# Patient Record
Sex: FEMALE | Race: OTHER | Employment: UNEMPLOYED | URBAN - METROPOLITAN AREA
[De-identification: names, ages, dates, MRNs, and addresses within clinical notes are randomized per-mention and may not be internally consistent; named-entity substitution may affect disease eponyms.]

---

## 2017-10-03 ENCOUNTER — OFFICE VISIT (OUTPATIENT)
Dept: OBGYN CLINIC | Facility: CLINIC | Age: 52
End: 2017-10-03

## 2017-10-03 ENCOUNTER — LAB ENCOUNTER (OUTPATIENT)
Dept: LAB | Facility: HOSPITAL | Age: 52
End: 2017-10-03
Attending: ADVANCED PRACTICE MIDWIFE

## 2017-10-03 VITALS
DIASTOLIC BLOOD PRESSURE: 76 MMHG | BODY MASS INDEX: 36 KG/M2 | WEIGHT: 176 LBS | HEART RATE: 94 BPM | SYSTOLIC BLOOD PRESSURE: 131 MMHG

## 2017-10-03 DIAGNOSIS — D25.9 UTERINE LEIOMYOMA, UNSPECIFIED LOCATION: ICD-10-CM

## 2017-10-03 DIAGNOSIS — N93.8 DUB (DYSFUNCTIONAL UTERINE BLEEDING): ICD-10-CM

## 2017-10-03 DIAGNOSIS — N93.8 DUB (DYSFUNCTIONAL UTERINE BLEEDING): Primary | ICD-10-CM

## 2017-10-03 DIAGNOSIS — E11.00 TYPE 2 DIABETES MELLITUS WITH HYPEROSMOLARITY WITHOUT COMA, WITHOUT LONG-TERM CURRENT USE OF INSULIN (HCC): ICD-10-CM

## 2017-10-03 DIAGNOSIS — R81 GLUCOSURIA: ICD-10-CM

## 2017-10-03 DIAGNOSIS — N85.2 ENLARGED UTERUS: ICD-10-CM

## 2017-10-03 PROCEDURE — 85060 BLOOD SMEAR INTERPRETATION: CPT

## 2017-10-03 PROCEDURE — 85025 COMPLETE CBC W/AUTO DIFF WBC: CPT

## 2017-10-03 PROCEDURE — 36415 COLL VENOUS BLD VENIPUNCTURE: CPT

## 2017-10-03 PROCEDURE — 99203 OFFICE O/P NEW LOW 30 MIN: CPT | Performed by: ADVANCED PRACTICE MIDWIFE

## 2017-10-03 RX ORDER — LISINOPRIL AND HYDROCHLOROTHIAZIDE 25; 20 MG/1; MG/1
TABLET ORAL
Status: ON HOLD | COMMUNITY
Start: 2014-05-07 | End: 2018-07-16

## 2017-10-03 RX ORDER — MEDROXYPROGESTERONE ACETATE 10 MG/1
10 TABLET ORAL DAILY
Qty: 30 TABLET | Refills: 3 | Status: ON HOLD | OUTPATIENT
Start: 2017-10-03 | End: 2018-07-16 | Stop reason: ALTCHOICE

## 2017-10-03 NOTE — PROGRESS NOTES
HPI:    Patient ID: Mirella Palma is a 46year old female. Has been having pelvic pain right to left side since this morning. Pain started 5 am today. 9/10. Took some tylenol for the pain and a little better. Denies discharge.   Is currently on menses status: Never Smoker                                                              Smokeless tobacco: Never Used                      Alcohol use:  No                 Urine dip today shows large blood and glucose of 2000         Current Outpatient Prescripti unspecified location    1. C/W Dr. Elizabeth Verma. Pt to be given Provera 30 mg QD for 3 months. 2.  Referral sent to [facility] for care   3. Ibuprofen for pain  4. Advised to see her PCP for elevated blood sugar ASAP.   Advised of danger of pers

## 2017-10-05 ENCOUNTER — TELEPHONE (OUTPATIENT)
Dept: OBGYN CLINIC | Facility: CLINIC | Age: 52
End: 2017-10-05

## 2017-10-05 NOTE — TELEPHONE ENCOUNTER
Per pt recently was seen by BULMARO who advised she needed a pap, pelvic u/s, and mammogram.  Per pt she was told her uterus was enlarged and was referred to [facility] to due exams and for f/u since is currently uninsured.    Has apt in [facility] on 10/27,

## 2017-10-05 NOTE — TELEPHONE ENCOUNTER
Per pt she would like to be seen in [place], has the appt schedule at [facility] till 10/27/2017, per pt she still has blood clots, pelvic  pain

## 2017-10-05 NOTE — TELEPHONE ENCOUNTER
Spoke to financial counselor of [facility] and was told of need to leave 25% deposit at the time of service. Pt should call price estimate dept with CPT codes for procedure. Number to call [phone].    Routing to calls pt to be informed

## 2017-10-06 NOTE — TELEPHONE ENCOUNTER
Pt informed of needing to leave 25% deposit prior to each service. Pt reported she will discuss with  and contact our office if needed.

## 2017-10-20 ENCOUNTER — TELEPHONE (OUTPATIENT)
Dept: OBGYN CLINIC | Facility: CLINIC | Age: 52
End: 2017-10-20

## 2017-10-20 NOTE — TELEPHONE ENCOUNTER
Patient had menses twice this month with occasional mild cramping  Cramping, no pain would like to find out if this is normal and what would be the next step of care

## 2017-10-20 NOTE — TELEPHONE ENCOUNTER
Pt saw Piero Lainez on 10/3 for bleeding. Stopped bleeding and is now bleeding again. Would like to know if that is normal? Got her menses twice this month.

## 2017-10-23 ENCOUNTER — OFFICE VISIT (OUTPATIENT)
Dept: OBGYN CLINIC | Facility: CLINIC | Age: 52
End: 2017-10-23

## 2017-10-23 VITALS
SYSTOLIC BLOOD PRESSURE: 124 MMHG | DIASTOLIC BLOOD PRESSURE: 70 MMHG | WEIGHT: 180 LBS | BODY MASS INDEX: 35.34 KG/M2 | HEIGHT: 60 IN

## 2017-10-23 DIAGNOSIS — R10.32 ABDOMINAL PAIN, LEFT LOWER QUADRANT: Primary | ICD-10-CM

## 2017-10-23 PROCEDURE — 99213 OFFICE O/P EST LOW 20 MIN: CPT | Performed by: ADVANCED PRACTICE MIDWIFE

## 2017-10-23 RX ORDER — IBUPROFEN 800 MG/1
800 TABLET ORAL EVERY 6 HOURS PRN
Qty: 30 TABLET | Refills: 1 | Status: ON HOLD | OUTPATIENT
Start: 2017-10-23 | End: 2018-07-16 | Stop reason: ALTCHOICE

## 2017-10-23 NOTE — TELEPHONE ENCOUNTER
Bulgarian phone line  #[phone] used to translate phone call. Appointment made for pt to see sjm at 3:00 pm today 10/23/17.

## 2017-10-23 NOTE — PROGRESS NOTES
S.  Was seen on 10/3/17 for bleeding from 9/28/17 and pain. Exam revealed 20 week size uterus. EMBX was attempted and failed due to inabilty to enter uterine cavity through cervix. Pt was referred to [facility] for care after consult with Dr. Hollie Rodriguez.   Here t

## 2018-01-03 ENCOUNTER — TELEPHONE (OUTPATIENT)
Dept: OBGYN CLINIC | Facility: CLINIC | Age: 53
End: 2018-01-03

## 2018-01-03 NOTE — TELEPHONE ENCOUNTER
Per pt she went to [facility] had emergency, and got really sick and had surgery up there, per pt she had a hysterectomy and was told she needs to see the oncologist, pt would like to be referred to one and would like to know what's the next step ?  Was told sh

## 2018-01-04 NOTE — TELEPHONE ENCOUNTER
I spoke with dr Home Valdovinos he said have the patient bring her records and see Him.  She and her  only speak Romanian and she needs to see an MD. He will then review the records and make a referral

## 2018-01-04 NOTE — TELEPHONE ENCOUNTER
Per pt the 32 th of Oct went to [facility] ([facility]), but rescheduled to the 17th of November because they didn't do anything for her. She didn't get to go to that apt because she went to [facility] for an emergency.    Per pt during her stay began hemorrhaging and had

## 2018-01-09 ENCOUNTER — OFFICE VISIT (OUTPATIENT)
Dept: OBGYN CLINIC | Facility: CLINIC | Age: 53
End: 2018-01-09

## 2018-01-09 ENCOUNTER — TELEPHONE (OUTPATIENT)
Dept: OBGYN CLINIC | Facility: CLINIC | Age: 53
End: 2018-01-09

## 2018-01-09 VITALS
WEIGHT: 171.13 LBS | HEIGHT: 59 IN | BODY MASS INDEX: 34.5 KG/M2 | SYSTOLIC BLOOD PRESSURE: 122 MMHG | DIASTOLIC BLOOD PRESSURE: 70 MMHG

## 2018-01-09 DIAGNOSIS — C54.9 UTERINE CANCER, SARCOMA (HCC): Primary | ICD-10-CM

## 2018-01-09 PROCEDURE — 99213 OFFICE O/P EST LOW 20 MIN: CPT | Performed by: OBSTETRICS & GYNECOLOGY

## 2018-01-09 NOTE — PROGRESS NOTES
HPI:    Patient ID: Jennifer Ta is a 46year old female. HPI  Patient here with records from hospitalization in [facility].  S/P SUKHJINDER-BSO for symptomatic Fibroid uterus on 11/8/2017. Pathology shows Uterine leiomyosarcoma Stage 1B. Patient is uninsured. Uterine cancer, sarcoma (hcc)  (primary encounter diagnosis)  Records reviewed and copies made. All questions answered. No orders of the defined types were placed in this encounter.       Meds This Visit:  No prescriptions requested or ordered in this

## 2018-01-09 NOTE — TELEPHONE ENCOUNTER
Per Maryan they don't cover uterine cancer  But provided numbers that may give uterine cancer resources  [phone]

## 2018-01-09 NOTE — TELEPHONE ENCOUNTER
Left vm regarding patient's case to the [facility] breast and cervical cancer program. Pt Dx with Uterine leiomyosarcoma Stage 1B. Patient is uninsured. Need to ask if patient may also qualified for assistance with this type of Dx.      Called [facility]

## 2018-01-12 NOTE — TELEPHONE ENCOUNTER
1)Called [facility] at [phone] and was referred to  Manuel Betancourt [phone] which is a coordinator for new oncology patients.  Left   Oncology fax [phone]    2) Per rep from Uterine Cancer PAS program [phone]  Free  will co

## 2018-01-16 NOTE — TELEPHONE ENCOUNTER
[phone] (home)  [place] [address] and was transferred to ER physician who gave the pager number to a gyne oncologist Dr. Dr. Logan Mckeon  Pager number [phone]  Per Dr. Amanda Mckeon she will be giving us or patient a call for an apt.

## 2018-01-16 NOTE — TELEPHONE ENCOUNTER
1) Called Masha Huynh and left another message. 2) Will need to call PAS program on 1/17 if no call from       3) Per pt she never got a denial letter because she was not a candidate for applying.

## 2018-01-17 NOTE — TELEPHONE ENCOUNTER
1)Faxed records over to number provided attn: Bayron Otero and left message to inquire if they were received. Found that Dr. Delia Todd was a resident at [facility] and specialty was obgyne, not gyne oncology.     2) Marck Georges, a [employer] non [employer] profit case ma

## 2018-01-17 NOTE — TELEPHONE ENCOUNTER
Pt informed of status. Verbalized understanding. Stated she was going to try and reapply for public aid tomorrow.

## 2018-01-17 NOTE — TELEPHONE ENCOUNTER
Artur Lott called, requesting records to be faxed to to [phone] with a cover sheet attn Radha Holloway

## 2018-01-18 NOTE — TELEPHONE ENCOUNTER
Per pt she would like to inform that she has a appt schedule for 02/14/2018 with the oncologist at [facility]

## 2018-01-23 ENCOUNTER — TELEPHONE (OUTPATIENT)
Dept: OBGYN CLINIC | Facility: CLINIC | Age: 53
End: 2018-01-23

## 2018-01-24 NOTE — TELEPHONE ENCOUNTER
[facility] contacted pt, has filled out paperwork with them  Has appt with oncology, a u/s and something else in feb and march at [email or web address]

## 2018-02-06 ENCOUNTER — OFFICE VISIT (OUTPATIENT)
Dept: OBGYN CLINIC | Facility: CLINIC | Age: 53
End: 2018-02-06

## 2018-02-06 ENCOUNTER — TELEPHONE (OUTPATIENT)
Dept: OBGYN CLINIC | Facility: CLINIC | Age: 53
End: 2018-02-06

## 2018-02-06 VITALS
SYSTOLIC BLOOD PRESSURE: 160 MMHG | BODY MASS INDEX: 34 KG/M2 | WEIGHT: 170.38 LBS | HEART RATE: 101 BPM | DIASTOLIC BLOOD PRESSURE: 82 MMHG

## 2018-02-06 DIAGNOSIS — R10.2 PELVIC PAIN IN FEMALE: Primary | ICD-10-CM

## 2018-02-06 PROCEDURE — 99213 OFFICE O/P EST LOW 20 MIN: CPT | Performed by: OBSTETRICS & GYNECOLOGY

## 2018-02-06 NOTE — TELEPHONE ENCOUNTER
adriane  Per pt financial councelor at [facility] assisted pt to get approved for public aid, will be choosing [employer]. Was told by [facility] that once her plan became effective they can begin treating her, but she is still going to [facility] for the apt she has on the 14th. Has made an apt with [facility] today for pain she recently started experiencing on right lower abdomen.

## 2018-02-06 NOTE — TELEPHONE ENCOUNTER
Pt states she's been experiencing some pain on her right side close to were she got her surgery, bottom right of umbilical area.  appt made for today at 2:30 [place] speaking

## 2018-02-06 NOTE — PROGRESS NOTES
HPI:    Patient ID: Trae Graves is a 46year old female. HPI  Patient here today because she feels pain in right pelvic area that radiates to right CVA. Denies fever or chills. Patient states pain is tolerable.   H/O Uterine Sarcoma that she had SUKHJINDER-B ASSESSMENT/PLAN:   Pelvic pain in female  (primary encounter diagnosis)  Precautions reviewed. Keep appointmnet with Gyne Onc. Go to ER if pain worsens. Continue with BP medication. No orders of the defined types were placed in this encounter.       Netta Nguyen

## 2018-07-16 ENCOUNTER — APPOINTMENT (OUTPATIENT)
Dept: CT IMAGING | Facility: HOSPITAL | Age: 53
DRG: 948 | End: 2018-07-16
Attending: EMERGENCY MEDICINE
Payer: MEDICAID

## 2018-07-16 ENCOUNTER — HOSPITAL ENCOUNTER (INPATIENT)
Facility: HOSPITAL | Age: 53
LOS: 1 days | Discharge: ANOTHER HOSPITAL | DRG: 948 | End: 2018-07-16
Attending: EMERGENCY MEDICINE | Admitting: HOSPITALIST
Payer: MEDICAID

## 2018-07-16 VITALS
HEIGHT: 58 IN | OXYGEN SATURATION: 96 % | BODY MASS INDEX: 34.82 KG/M2 | TEMPERATURE: 100 F | DIASTOLIC BLOOD PRESSURE: 63 MMHG | SYSTOLIC BLOOD PRESSURE: 103 MMHG | HEART RATE: 122 BPM | WEIGHT: 165.88 LBS | RESPIRATION RATE: 18 BRPM

## 2018-07-16 DIAGNOSIS — R00.0 TACHYCARDIA: ICD-10-CM

## 2018-07-16 DIAGNOSIS — R10.9 INTRACTABLE ABDOMINAL PAIN: Primary | ICD-10-CM

## 2018-07-16 DIAGNOSIS — C79.9 METASTATIC CANCER (HCC): ICD-10-CM

## 2018-07-16 DIAGNOSIS — C55 UTERINE LEIOMYOSARCOMA (HCC): ICD-10-CM

## 2018-07-16 PROCEDURE — 99236 HOSP IP/OBS SAME DATE HI 85: CPT | Performed by: HOSPITALIST

## 2018-07-16 PROCEDURE — 71260 CT THORAX DX C+: CPT | Performed by: EMERGENCY MEDICINE

## 2018-07-16 PROCEDURE — 99252 IP/OBS CONSLTJ NEW/EST SF 35: CPT | Performed by: OBSTETRICS & GYNECOLOGY

## 2018-07-16 PROCEDURE — 74177 CT ABD & PELVIS W/CONTRAST: CPT | Performed by: EMERGENCY MEDICINE

## 2018-07-16 RX ORDER — SODIUM CHLORIDE 0.9 % (FLUSH) 0.9 %
3 SYRINGE (ML) INJECTION AS NEEDED
Status: DISCONTINUED | OUTPATIENT
Start: 2018-07-16 | End: 2018-07-16

## 2018-07-16 RX ORDER — LISINOPRIL AND HYDROCHLOROTHIAZIDE 25; 20 MG/1; MG/1
1 TABLET ORAL DAILY
Status: DISCONTINUED | OUTPATIENT
Start: 2018-07-16 | End: 2018-07-16

## 2018-07-16 RX ORDER — DILTIAZEM HYDROCHLORIDE 60 MG/1
60 TABLET, FILM COATED ORAL AS NEEDED
Qty: 1 TABLET | Refills: 0 | Status: SHIPPED | OUTPATIENT
Start: 2018-07-16

## 2018-07-16 RX ORDER — SODIUM PHOSPHATE, DIBASIC AND SODIUM PHOSPHATE, MONOBASIC 7; 19 G/133ML; G/133ML
1 ENEMA RECTAL ONCE AS NEEDED
Status: DISCONTINUED | OUTPATIENT
Start: 2018-07-16 | End: 2018-07-16

## 2018-07-16 RX ORDER — HYDROCODONE BITARTRATE AND ACETAMINOPHEN 5; 325 MG/1; MG/1
1 TABLET ORAL EVERY 4 HOURS PRN
Qty: 30 TABLET | Refills: 0 | Status: SHIPPED | OUTPATIENT
Start: 2018-07-16 | End: 2018-07-16

## 2018-07-16 RX ORDER — POLYETHYLENE GLYCOL 3350 17 G/17G
17 POWDER, FOR SOLUTION ORAL DAILY PRN
Status: DISCONTINUED | OUTPATIENT
Start: 2018-07-16 | End: 2018-07-16

## 2018-07-16 RX ORDER — DEXTROSE MONOHYDRATE 25 G/50ML
50 INJECTION, SOLUTION INTRAVENOUS AS NEEDED
Status: DISCONTINUED | OUTPATIENT
Start: 2018-07-16 | End: 2018-07-16

## 2018-07-16 RX ORDER — GABAPENTIN 250 MG/5ML
100 SOLUTION ORAL 3 TIMES DAILY
COMMUNITY

## 2018-07-16 RX ORDER — SODIUM CHLORIDE 9 MG/ML
INJECTION, SOLUTION INTRAVENOUS CONTINUOUS
Status: DISCONTINUED | OUTPATIENT
Start: 2018-07-16 | End: 2018-07-16

## 2018-07-16 RX ORDER — POTASSIUM CHLORIDE 14.9 MG/ML
20 INJECTION INTRAVENOUS ONCE
Status: COMPLETED | OUTPATIENT
Start: 2018-07-16 | End: 2018-07-16

## 2018-07-16 RX ORDER — HYDROCODONE BITARTRATE AND ACETAMINOPHEN 5; 325 MG/1; MG/1
1 TABLET ORAL EVERY 4 HOURS PRN
Status: DISCONTINUED | OUTPATIENT
Start: 2018-07-16 | End: 2018-07-16

## 2018-07-16 RX ORDER — BISACODYL 10 MG
10 SUPPOSITORY, RECTAL RECTAL
Status: DISCONTINUED | OUTPATIENT
Start: 2018-07-16 | End: 2018-07-16

## 2018-07-16 RX ORDER — HYDROMORPHONE HCL IN WATER/PF 1 MG/ML
0.5 SYRINGE (ML) INJECTION
Status: DISCONTINUED | OUTPATIENT
Start: 2018-07-16 | End: 2018-07-16

## 2018-07-16 RX ORDER — HYDROMORPHONE HYDROCHLORIDE 1 MG/ML
0.5 INJECTION, SOLUTION INTRAMUSCULAR; INTRAVENOUS; SUBCUTANEOUS EVERY 30 MIN PRN
Status: ACTIVE | OUTPATIENT
Start: 2018-07-16 | End: 2018-07-16

## 2018-07-16 RX ORDER — PROCHLORPERAZINE MALEATE 5 MG/1
10 TABLET ORAL EVERY 6 HOURS PRN
COMMUNITY

## 2018-07-16 RX ORDER — HYDROMORPHONE HYDROCHLORIDE 1 MG/ML
0.4 INJECTION, SOLUTION INTRAMUSCULAR; INTRAVENOUS; SUBCUTANEOUS EVERY 2 HOUR PRN
Status: DISCONTINUED | OUTPATIENT
Start: 2018-07-16 | End: 2018-07-16

## 2018-07-16 RX ORDER — ZOLPIDEM TARTRATE 5 MG/1
5 TABLET ORAL NIGHTLY PRN
Status: DISCONTINUED | OUTPATIENT
Start: 2018-07-16 | End: 2018-07-16

## 2018-07-16 RX ORDER — ACETAMINOPHEN 325 MG/1
650 TABLET ORAL EVERY 6 HOURS PRN
Status: DISCONTINUED | OUTPATIENT
Start: 2018-07-16 | End: 2018-07-16

## 2018-07-16 RX ORDER — MORPHINE SULFATE 4 MG/ML
8 INJECTION, SOLUTION INTRAMUSCULAR; INTRAVENOUS ONCE
Status: COMPLETED | OUTPATIENT
Start: 2018-07-16 | End: 2018-07-16

## 2018-07-16 RX ORDER — DILTIAZEM HYDROCHLORIDE 60 MG/1
60 TABLET, FILM COATED ORAL AS NEEDED
Status: DISCONTINUED | OUTPATIENT
Start: 2018-07-16 | End: 2018-07-16

## 2018-07-16 RX ORDER — ONDANSETRON 2 MG/ML
4 INJECTION INTRAMUSCULAR; INTRAVENOUS EVERY 4 HOURS PRN
Status: DISCONTINUED | OUTPATIENT
Start: 2018-07-16 | End: 2018-07-16

## 2018-07-16 RX ORDER — HEPARIN SODIUM 5000 [USP'U]/ML
5000 INJECTION, SOLUTION INTRAVENOUS; SUBCUTANEOUS EVERY 12 HOURS SCHEDULED
Status: DISCONTINUED | OUTPATIENT
Start: 2018-07-16 | End: 2018-07-16

## 2018-07-16 RX ORDER — METOCLOPRAMIDE HYDROCHLORIDE 5 MG/ML
10 INJECTION INTRAMUSCULAR; INTRAVENOUS EVERY 8 HOURS PRN
Status: DISCONTINUED | OUTPATIENT
Start: 2018-07-16 | End: 2018-07-16

## 2018-07-16 RX ORDER — ENOXAPARIN SODIUM 100 MG/ML
40 INJECTION SUBCUTANEOUS DAILY
Qty: 1 VIAL | Refills: 0 | Status: SHIPPED | OUTPATIENT
Start: 2018-07-17

## 2018-07-16 RX ORDER — SODIUM CHLORIDE 0.9 % (FLUSH) 0.9 %
10 SYRINGE (ML) INJECTION AS NEEDED
Status: DISCONTINUED | OUTPATIENT
Start: 2018-07-16 | End: 2018-07-16

## 2018-07-16 RX ORDER — ONDANSETRON 2 MG/ML
4 INJECTION INTRAMUSCULAR; INTRAVENOUS EVERY 6 HOURS PRN
Status: DISCONTINUED | OUTPATIENT
Start: 2018-07-16 | End: 2018-07-16

## 2018-07-16 RX ORDER — MORPHINE SULFATE 4 MG/ML
4 INJECTION, SOLUTION INTRAMUSCULAR; INTRAVENOUS ONCE
Status: DISCONTINUED | OUTPATIENT
Start: 2018-07-16 | End: 2018-07-16

## 2018-07-16 RX ORDER — POLYETHYLENE GLYCOL 3350 17 G/17G
17 POWDER, FOR SOLUTION ORAL DAILY PRN
Qty: 1 EACH | Refills: 0 | Status: SHIPPED | OUTPATIENT
Start: 2018-07-16

## 2018-07-16 RX ORDER — SODIUM CHLORIDE 9 MG/ML
150 INJECTION, SOLUTION INTRAVENOUS CONTINUOUS
Qty: 1 CONTAINER | Refills: 0 | Status: SHIPPED | OUTPATIENT
Start: 2018-07-16

## 2018-07-16 RX ORDER — METOPROLOL SUCCINATE 25 MG/1
25 TABLET, EXTENDED RELEASE ORAL DAILY
Status: DISCONTINUED | OUTPATIENT
Start: 2018-07-16 | End: 2018-07-16

## 2018-07-16 RX ORDER — ENOXAPARIN SODIUM 100 MG/ML
40 INJECTION SUBCUTANEOUS DAILY
Status: DISCONTINUED | OUTPATIENT
Start: 2018-07-16 | End: 2018-07-16

## 2018-07-16 RX ORDER — HYDROMORPHONE HYDROCHLORIDE 1 MG/ML
0.8 INJECTION, SOLUTION INTRAMUSCULAR; INTRAVENOUS; SUBCUTANEOUS EVERY 2 HOUR PRN
Status: DISCONTINUED | OUTPATIENT
Start: 2018-07-16 | End: 2018-07-16

## 2018-07-16 RX ORDER — MORPHINE SULFATE 4 MG/ML
8 INJECTION, SOLUTION INTRAMUSCULAR; INTRAVENOUS ONCE
Status: DISCONTINUED | OUTPATIENT
Start: 2018-07-16 | End: 2018-07-16 | Stop reason: SDUPTHER

## 2018-07-16 RX ORDER — METOPROLOL SUCCINATE 25 MG/1
25 TABLET, EXTENDED RELEASE ORAL DAILY
COMMUNITY

## 2018-07-16 RX ORDER — ONDANSETRON 2 MG/ML
4 INJECTION INTRAMUSCULAR; INTRAVENOUS EVERY 6 HOURS PRN
Qty: 1 VIAL | Refills: 0 | Status: SHIPPED | OUTPATIENT
Start: 2018-07-16

## 2018-07-16 RX ORDER — MAGNESIUM SULFATE HEPTAHYDRATE 40 MG/ML
2 INJECTION, SOLUTION INTRAVENOUS ONCE
Status: COMPLETED | OUTPATIENT
Start: 2018-07-16 | End: 2018-07-16

## 2018-07-16 RX ORDER — ZOLPIDEM TARTRATE 5 MG/1
5 TABLET ORAL NIGHTLY PRN
Qty: 1 TABLET | Refills: 0 | Status: SHIPPED | OUTPATIENT
Start: 2018-07-16

## 2018-07-16 RX ORDER — DEXTROSE, SODIUM CHLORIDE, AND POTASSIUM CHLORIDE 5; .9; .15 G/100ML; G/100ML; G/100ML
INJECTION INTRAVENOUS CONTINUOUS
Status: DISCONTINUED | OUTPATIENT
Start: 2018-07-16 | End: 2018-07-16

## 2018-07-16 RX ORDER — HYDROCODONE BITARTRATE AND ACETAMINOPHEN 5; 325 MG/1; MG/1
2 TABLET ORAL EVERY 4 HOURS PRN
Status: DISCONTINUED | OUTPATIENT
Start: 2018-07-16 | End: 2018-07-16

## 2018-07-16 RX ORDER — HYDROMORPHONE HCL IN WATER/PF 1 MG/ML
1 SYRINGE (ML) INJECTION
Status: DISCONTINUED | OUTPATIENT
Start: 2018-07-16 | End: 2018-07-16

## 2018-07-16 RX ORDER — ACETAMINOPHEN 325 MG/1
650 TABLET ORAL EVERY 4 HOURS PRN
Status: DISCONTINUED | OUTPATIENT
Start: 2018-07-16 | End: 2018-07-16

## 2018-07-16 RX ORDER — DOCUSATE SODIUM 100 MG/1
100 CAPSULE, LIQUID FILLED ORAL 2 TIMES DAILY
Status: DISCONTINUED | OUTPATIENT
Start: 2018-07-16 | End: 2018-07-16

## 2018-07-16 RX ORDER — HYDROMORPHONE HYDROCHLORIDE 1 MG/ML
0.2 INJECTION, SOLUTION INTRAMUSCULAR; INTRAVENOUS; SUBCUTANEOUS EVERY 2 HOUR PRN
Status: DISCONTINUED | OUTPATIENT
Start: 2018-07-16 | End: 2018-07-16

## 2018-07-16 RX ORDER — INSULIN ASPART 100 [IU]/ML
5 INJECTION, SOLUTION INTRAVENOUS; SUBCUTANEOUS
COMMUNITY

## 2018-07-16 NOTE — DISCHARGE SUMMARY
More than 30 min spent on dc  Dc summary # [id number]  Hospital Discharge Diagnoses: met uterine ca       59-90 High Risk  29-58 Medium Risk  0-28   Low Risk. TCM Follow-Up Recommendation:  LACE > 58:  High Risk of readmission after discharge from the hosp

## 2018-07-16 NOTE — CONSULTS
[facility]  Obstetrics and Gynecology  Focused Gynecology Problem Exam  Filiberto Vargas MD    Susana Figueroa is a 46year old female presenting for Abdomen/Flank Pain (GI/)  . HPI:   Patient presents with:  Abdomen/Flank Pain (GI/)    A: 46 y.o. • Hypertension Father    • Other Franca Moraes Mother 70     mother  of pulmonary fibrosis   • Diabetes Other      siblings         Social History  Social History   Marital status:   Spouse name: N/A    Years of education: N/A  Number of children: N hydroureter and left pelvocaliectasis. 2. There is complete encasement of the right external iliac artery by a dominant pelvic mass with narrowing of the transiting segment, but no evidence of occlusion.  Partial encasement of the left external iliac arter mouth daily as needed (For opiate-related constipation.). HYDROcodone-acetaminophen 7.5-325 MG/15ML Oral Solution 90 mL 0      Sig: Take 10 mL by mouth every 8 (eight) hours as needed (For breakthrough pain.  Use caution when taking this medication - i Destini Sepulveda MD  7/16/2018  12:58 PM

## 2018-07-16 NOTE — ED PROVIDER NOTES
Patient Seen in: [facility] AND [facility] Emergency Department    History   Patient presents with:  Abdomen/Flank Pain (GI/)    Stated Complaint: lower abdominal pain x 2 hours     HPI    45 yo F with PMH DM, HTN, known pelvic neoplasm off chemotherapy secondar tobacco: Never Used                      Alcohol use: No                Review of Systems :  Constitutional: As per HPI  Gastrointestinal: Negative for vomiting; (+) abd pain. Genitourinary: Negative for dysuria and hematuria.      Positive for stated com Abnormal; Notable for the following:     WBC 11.3 (*)     RBC 2.89 (*)     HGB 8.3 (*)     HCT 25.7 (*)     RDW 15.5 (*)      (*)     All other components within normal limits   CBC WITH DIFFERENTIAL WITH PLATELET    Narrative:      The following ord results in compression of the right common and external iliac veins with subcutaneous edema of the proximal right lower extremity, suggesting sequela venous stasis or occult venous thrombosis. No intravascular thrombus evident by CT.  Consider lower extremi which partially encase the aorta. There is a 2.3 cm enhancing lesion in the anterior abdominal wall. No bowel obstruction or inflammation. No free air or free fluid. No hepatic masses. Gallbladder is distended with layering biliary sludge.   No primary onc Dr. Adrian Bullard - in agreement with DC pending pain control/CT results. 6008: Pain ongoing despite initial IV meds - will admit for pain control.   2071: Case d/w gyne Dr. Nicholas Frank - graciously to see in consultation, will continue with IVF/analgesi

## 2018-07-16 NOTE — CM/SW NOTE
Spoke with Bed Management @ [address] patient will be transferred to [address] report can be called to [phone]. This EDCM will arrange transportation PRN. Accepting MD Dr. Jose Munoz.

## 2018-07-16 NOTE — H&P
[facility]    PATIENT'S NAME: Dave aVrner   ATTENDING PHYSICIAN: Tomas Rashid MD   PATIENT ACCOUNT#:   [id number]    LOCATION:  [address] [place] [address] #:   [id number]       YOB: 1965  ADMISSION DATE:       07/16/2018 a day, Compazine 10 mg every 6 hours as needed, Norco 7.5/3.25 oral solution every 4 hours as needed, gabapentin 100 mg 3 times a day. ALLERGIES:  None. FAMILY HISTORY:  Her father  at age 68, and he had hypertension.   Her mother  at age 70 107,000. ASSESSMENT AND PLAN:    1. Metastatic uterine cancer, with intractable pain, better with IV Dilaudid.   No realistic further chemotherapy per her oncologist, although patient believes that she will be enrolled in a clinical trial.  2.   Adrianit

## 2018-07-17 NOTE — DISCHARGE SUMMARY
[facility]    PATIENT'S NAME: Gemma Kaufman   ATTENDING PHYSICIAN: Tomas Rashid MD   PATIENT ACCOUNT#:   [id number]    LOCATION:  [address] [place] [address] #:   [id number]       YOB: 1965  ADMISSION DATE:       07/16/2018 the History and Physical has already been dictated, and this Discharge Summary would be available to them shortly. Dictated By Miky Rashid MD  d: 07/16/2018 16:14:52  t: 07/17/2018 07:11:23  Job [id number]  [facility]/

## 2018-07-17 NOTE — PAYOR COMM NOTE
--------------  ADMISSION REVIEW     Payor: Delgado Macias #:  [id number]  Authorization Number: [id number]    Admit date: 7/16/18  Admit time: 0830           Patient Seen in: [facility] Emergency Department Negative for dysuria and hematuria.      Positive for stated complaint: lower abdominal pain x 2 hours    ED Triage Vitals [07/16/18 0147]  BP: 133/84  Pulse: (!) 141  Resp: 20  Temp: 99 °F (37.2 °C)  Temp src: Oral  SpO2: 97 %  O2 Device: None (Room air) off chemotherapy secondary to ineffectiveness. She is being treated by Dr. Quique Marvin, Gynecology Oncology at [facility], for uterine cancer.   The patient has completed chemotherapy, and the patient and her sister-in-law said that she would be ge room air. HEENT:  She has secondary alopecia from chemotherapy. She does not appear icteric, and her extraocular muscles are intact. LUNGS:  Crackles in both bases. HEART:  Normal S1, S2.   No S3.  ABDOMEN:  Soft, mildly tender in the periumbilical a underlying cause, but she is not hypoxic. She has no pericardial effusion. Her pain is controlled. In fact, she was sleeping half of the time and she is still tachycardic.   I will order echocardiogram and a Cardizem drip, but at this point I do not know she may be more of a candidate for hospice. She is currently comfortable and stable, and will be discharged by [facility] ambulance with 2L of oxygen.   The patient will be given Dilaudid for pain for the journey, so she does not become uncomfortable in the kimber

## 2022-07-19 NOTE — TELEPHONE ENCOUNTER
Nurse 5674 Jamee Carvalho from [facility] ([phone]) [facility] in [place] stated someone would contact patient to see if she qualifies for financial assistance. If she is accepted, they would make apt with Dr. Thomas Better gyne oncology.     Pt's med r Additional Notes: Patient consent was obtained to proceed with the visit and recommended plan of care after discussion of all risks and benefits, including the risk of COVID-19 exposure. Detail Level: Simple Render Risk Assessment In Note?: no

## (undated) NOTE — IP AVS SNAPSHOT
Patient Demographics     Address  [address] Phone  [phone] [facility])  [phone] [facility]      Emergency Contact(s)     Name Relation Home Work Mobile    VeronicaRomeo Spouse       Allergies as of 7/16/2 insulin aspart 100 UNIT/ML Soln  Commonly known as:  Kathy Elders  Next dose due:  7/16/18 dinnertime      Inject 5 Units into the skin 3 (three) times daily before meals. Metoprolol Succinate ER 25 MG Tb24  Commonly known as:   Toprol XL  Next dose due ** SITE UNKNOWN **     Order ID Medication Name Action Time Action Reason Comments    [id number] 0.9%  NaCl infusion 07/16/18 0949 New Bag  bolus    [id number] 0.9%  NaCl infusion 07/16/18 1544 New Bag      [id number] HYDROmorphone HCl (DILAUDID) 1 MG/ML inj Ordering provider:  Adama Elkins MD  07/16/18 1257 [facility]   Narrative:  Method HPLC           Expected Results           Fasting Plasma Glucose (mg/dL)     HbA1c(%)       Mean         Range       5.0                  90 Type Source Collected On   Blood — 07/16/18 0202          Components    Component Value Reference Range Flag Lab   Hold [facility] Resulted — — [place]            RAINBOW DRAW [place] [[id number]]  Resulted: 07/16/18 0400, Result status: Final result Component Value Reference Range Flag Lab   Hold Lavender Auto Resulted — — [place] [facility]            MAGNESIUM [[id number]] (Abnormal)  Resulted: 07/16/18 0248, Result status: Final result   Ordering provider:  Miriam Vazquez MD  07/16/18 0232 Resulting lab: ---------                               -----------         ------                     CBC W/ DIFFERENTIAL[[id number]]          Abnormal            Final result                 Please view results for these tests on the individual orders.     Specimen Inform Related Notes:  Original Note by Prateek Velez MD (Physician) filed at 7/16/2018  4:15 PM       [facility]    PATIENT'S NAME: Gemma Kaufman   ATTENDING PHYSICIAN: Tomas Rashid MD   PATIENT ACCOUNT#:   [id number]    LOCATION:  [facility] MEDICATIONS:  Home medications are metformin 1000 mg twice a day, Toprol XL 25 mg daily, 5 units of NovoLog 3 times a day, Compazine 10 mg every 6 hours as needed, Norco 7.5/3.25 oral solution every 4 hours as needed, gabapentin 100 mg 3 times a day.      A 12, creatinine 0.73. Albumin is 1.5.  TSH is 2.67. White count 11,300, hemoglobin 8.3, platelets 523,637. ASSESSMENT AND PLAN:    1. Metastatic uterine cancer, with intractable pain, better with IV Dilaudid.   No realistic further chemotherapy per he Attribution information is not available for this note.                       Consults - MD Consult Notes      Consults signed by Jamir Alcocer MD at 7/16/2018  1:04 PM     Author:  Jamir Alcocer MD Service:  Obstetrics Author Type:  Physician Allergies:  No Known Allergies  HISTORY:     OB History    Para Term  AB Living   1       1     SAB TAB Ectopic Multiple Live Births   1              # Outcome Date GA Lbr Shashi/2nd Weight Sex Delivery Anes PTL Lv   1 SAB artery calcifications. The preliminary report was provided by [facility] and there is no significant discrepancy.    Dictated by (CST): Lissy Hannon MD on 7/16/2018 at 8:19     Approved by (CST): Lissy Hannon MD on 7/16/2018 at 8:28 I would recommend patient be evaluated and managed by gynecologic oncology for her pain management and follow-up of her oncologic care.     ORDERS:     Orders Placed This Encounter      CBC With Differential With Platelet      Basic Metabolic Panel (8) INITIATE ALGORITHM FOR HYPOGLYCEMIA FOR ADULTS (NON-PREG)  CHARGING COMMUNICATION  PATIENT EDUCATION  PROVIDE PATIENT EDUCATION MATERIALS  CARDIAC MONITORING  EKG 12-LEAD  EKG 12-LEAD  IP CONSULT TO ONCOLOGY  IP CONSULT TO OBSTETRICS  NURSING CONSULT TO HANNAH Discharge Summaries signed by Meredith Torres MD at 7/16/2018  4:13 PM  Version 2 of 3    Author:  Meredith Torres MD Service:  Hospitalist Author Type:  Physician    Filed:  7/16/2018  4:13 PM Date of Service:  7/16/2018  4:06 PM Status: Physical Therapy Notes (last 72 hours) (Notes from 7/13/2018  4:33 PM through 7/16/2018  4:33 PM)     No notes of this type exist for this encounter.       Occupational Therapy Notes (last 72 hours) (Notes from 7/13/2018  4:33 PM through 7/16/2018  4:33 PM)